# Patient Record
(demographics unavailable — no encounter records)

---

## 2024-10-28 NOTE — DISCUSSION/SUMMARY
[FreeTextEntry1] : 36-year-old -1-0-5 LMP 2019 status post hysterectomy here for evaluation of vaginal discharge   plan: -Pelvic exam with copious thick white discharge.  Consistent with vaginitis.  Discussed use of metronidazole followed by fluconazole.  Affirm & g/c obtained. -Vaginal vulvar hygiene discussed with patient -Clotrimazole-betamethasone cream for external vulvar irritation, for use up to 2 weeks -Return to office for routine annual

## 2024-10-28 NOTE — HISTORY OF PRESENT ILLNESS
[N] : Patient is not sexually active [Menarche Age: ____] : age at menarche was [unfilled] [FreeTextEntry1] : 36-year-old -1-0-5 LMP 2019 status post hysterectomy here for evaluation of vaginal discharge  Patient here with complaints of vaginal itching and discomfort over the last 3 weeks.  Currently undergoing laser hair therapy along vaginal region.  Unsure if wipes/gel use triggered external irritation.  Over the last 1 to 2 weeks also describing thick white discharge.  He used home fluconazole over the weekend which somewhat improved symptoms.  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  Required over 20 units of blood products.  Delivered at [facility] GynHx: Denies hx of ovarian cysts, hx of fibroids, hx of endometriosis, hx of STD's PMH: Denies PSH:  x 5, T AH, BS, breast augmentation ALL: Tramadol SocHx: Lives with family. Feels safe at home. Denies depression or anxiety related symptoms. Never used tobacco products, denies illicit drug use/EtOH. FamHx: no significant family history of GYN malignancy or disease  [PGHxTotal] : 5 [[facility] Center[facility]ul[facility]] : 1 [PGxPremature] : 4 [PGHxAbortions] : 0 [[facility]xLiving] : 5 [PGHxABInduced] : 0 [PGHxABSpont] : 0 [PGHxEctopic] : 0 [PGHxMultBirths] : 0

## 2024-10-28 NOTE — PHYSICAL EXAM
[Chaperone Present] : A chaperone was present in the examining room during all aspects of the physical examination [34954] : A chaperone was present during the pelvic exam. [Appropriately responsive] : appropriately responsive [Alert] : alert [No Acute Distress] : no acute distress [Soft] : soft [Non-tender] : non-tender [No Lesions] : no lesions [No Mass] : no mass [Oriented x3] : oriented x3 [Labia Majora] : normal [Labia Minora] : normal [Normal] : normal [Discharge] : a  ~M vaginal discharge was present [Moderate] : moderate [White] : white [Thick] : thick [Absent] : absent [Uterine Adnexae] : non-palpable